# Patient Record
Sex: FEMALE | Race: WHITE | URBAN - METROPOLITAN AREA
[De-identification: names, ages, dates, MRNs, and addresses within clinical notes are randomized per-mention and may not be internally consistent; named-entity substitution may affect disease eponyms.]

---

## 2020-10-09 ENCOUNTER — NEW PATIENT (OUTPATIENT)
Dept: URBAN - METROPOLITAN AREA CLINIC 24 | Facility: CLINIC | Age: 75
End: 2020-10-09
Payer: COMMERCIAL

## 2020-10-09 DIAGNOSIS — K50.10 CROHN'S DISEASE OF LARGE INTESTINE WITHOUT COMPLICATIONS: ICD-10-CM

## 2020-10-09 DIAGNOSIS — H40.013 OPEN ANGLE WITH BORDERLINE FINDINGS, LOW RISK, BILATERAL: ICD-10-CM

## 2020-10-09 DIAGNOSIS — H26.493 OTHER SECONDARY CATARACT, BILATERAL: ICD-10-CM

## 2020-10-09 DIAGNOSIS — H04.123 TEAR FILM INSUFFICIENCY OF BILATERAL LACRIMAL GLANDS: Primary | ICD-10-CM

## 2020-10-09 PROCEDURE — 92133 CPTRZD OPH DX IMG PST SGM ON: CPT | Performed by: OPTOMETRIST

## 2020-10-09 PROCEDURE — 92004 COMPRE OPH EXAM NEW PT 1/>: CPT | Performed by: OPTOMETRIST

## 2020-10-09 ASSESSMENT — INTRAOCULAR PRESSURE
OS: 11
OD: 13

## 2021-06-29 ENCOUNTER — OFFICE VISIT (OUTPATIENT)
Dept: URBAN - METROPOLITAN AREA CLINIC 24 | Facility: CLINIC | Age: 76
End: 2021-06-29
Payer: COMMERCIAL

## 2021-06-29 DIAGNOSIS — H57.10 OCULAR PAIN, UNSPECIFIED EYE: ICD-10-CM

## 2021-06-29 PROCEDURE — 92250 FUNDUS PHOTOGRAPHY W/I&R: CPT | Performed by: OPTOMETRIST

## 2021-06-29 PROCEDURE — 99214 OFFICE O/P EST MOD 30 MIN: CPT | Performed by: OPTOMETRIST

## 2021-06-29 ASSESSMENT — INTRAOCULAR PRESSURE
OD: 11
OS: 11

## 2021-06-29 ASSESSMENT — VISUAL ACUITY
OS: 20/80
OD: 20/50

## 2021-06-29 NOTE — IMPRESSION/PLAN
Impression: Open angle with borderline findings, low risk, bilateral
- denies fhx
- Pachs: [phone] Plan: Secondary to cupping OU Updated disc photos today. Again low IOP Will continue to monitor w/o gtts

## 2021-06-29 NOTE — IMPRESSION/PLAN
Impression: Ocular pain; bilateral: H57.10.
-- onset ~ 8 months, pt reports no resolution w/ afts. -- pt recently seen by neuro-ophthalmology @ [facility]. Reports MRI / clinical exam nml
-- Has neuromuscular degenerative disorder multiple system atrophy Plan: Pt advised there is no clear etiology for ongoing eye pain. Recommend continue ongoing care w/ [facility] as directed.

## 2021-06-29 NOTE — IMPRESSION/PLAN
Impression: Other secondary cataract, bilateral Plan: Opacified capsule with option for Yag laser capsulotomy. Discussed procedure, risks, benefits and side effects.   Patient request Yag laser capsulotomy, OU OS first.

## 2021-07-15 ENCOUNTER — ADULT PHYSICAL (OUTPATIENT)
Dept: URBAN - METROPOLITAN AREA CLINIC 24 | Facility: CLINIC | Age: 76
End: 2021-07-15
Payer: COMMERCIAL

## 2021-07-15 DIAGNOSIS — Z01.818 ENCOUNTER FOR OTHER PREPROCEDURAL EXAMINATION: Primary | ICD-10-CM

## 2021-07-15 PROCEDURE — 99203 OFFICE O/P NEW LOW 30 MIN: CPT | Performed by: PHYSICIAN ASSISTANT

## 2021-07-20 ENCOUNTER — SURGERY (OUTPATIENT)
Dept: URBAN - METROPOLITAN AREA SURGERY 12 | Facility: SURGERY | Age: 76
End: 2021-07-20
Payer: COMMERCIAL

## 2021-07-20 PROCEDURE — 66821 AFTER CATARACT LASER SURGERY: CPT | Performed by: OPHTHALMOLOGY

## 2021-08-03 ENCOUNTER — SURGERY (OUTPATIENT)
Dept: URBAN - METROPOLITAN AREA SURGERY 12 | Facility: SURGERY | Age: 76
End: 2021-08-03
Payer: COMMERCIAL

## 2021-08-03 PROCEDURE — 66821 AFTER CATARACT LASER SURGERY: CPT | Performed by: OPHTHALMOLOGY
